# Patient Record
Sex: FEMALE | Race: WHITE | Employment: PART TIME | URBAN - NONMETROPOLITAN AREA
[De-identification: names, ages, dates, MRNs, and addresses within clinical notes are randomized per-mention and may not be internally consistent; named-entity substitution may affect disease eponyms.]

---

## 2018-10-18 ENCOUNTER — OFFICE VISIT (OUTPATIENT)
Dept: FAMILY MEDICINE CLINIC | Age: 23
End: 2018-10-18
Payer: COMMERCIAL

## 2018-10-18 VITALS
HEART RATE: 94 BPM | WEIGHT: 225 LBS | SYSTOLIC BLOOD PRESSURE: 110 MMHG | DIASTOLIC BLOOD PRESSURE: 86 MMHG | RESPIRATION RATE: 16 BRPM | TEMPERATURE: 97.2 F | OXYGEN SATURATION: 98 %

## 2018-10-18 DIAGNOSIS — J06.9 VIRAL URI WITH COUGH: Primary | ICD-10-CM

## 2018-10-18 DIAGNOSIS — H61.23 BILATERAL IMPACTED CERUMEN: ICD-10-CM

## 2018-10-18 PROCEDURE — 99202 OFFICE O/P NEW SF 15 MIN: CPT | Performed by: NURSE PRACTITIONER

## 2018-10-18 ASSESSMENT — ENCOUNTER SYMPTOMS
COUGH: 1
SINUS PAIN: 0
SORE THROAT: 0
RHINORRHEA: 1
TROUBLE SWALLOWING: 0
SINUS PRESSURE: 0
SHORTNESS OF BREATH: 0

## 2018-10-18 ASSESSMENT — PATIENT HEALTH QUESTIONNAIRE - PHQ9
SUM OF ALL RESPONSES TO PHQ QUESTIONS 1-9: 0
SUM OF ALL RESPONSES TO PHQ QUESTIONS 1-9: 0
SUM OF ALL RESPONSES TO PHQ9 QUESTIONS 1 & 2: 0
1. LITTLE INTEREST OR PLEASURE IN DOING THINGS: 0
2. FEELING DOWN, DEPRESSED OR HOPELESS: 0

## 2018-10-18 NOTE — PATIENT INSTRUCTIONS
Patient Education      Continue current medication. Follow up as needed. If worse return or go to ER. Upper Respiratory Infection (Cold): Care Instructions  Your Care Instructions  Patient Education        Earwax Blockage: Care Instructions  Your Care Instructions    Earwax is a natural substance that protects the ear canal. Normally, earwax drains from the ears and does not cause problems. Sometimes earwax builds up and hardens. Earwax blockage (also called cerumen impaction) can cause some loss of hearing and pain. When wax is tightly packed, you will need to have your doctor remove it. Follow-up care is a key part of your treatment and safety. Be sure to make and go to all appointments, and call your doctor if you are having problems. It's also a good idea to know your test results and keep a list of the medicines you take. How can you care for yourself at home? · Do not try to remove earwax with cotton swabs, fingers, or other objects. This can make the blockage worse and damage the eardrum. · If your doctor recommends that you try to remove earwax at home:  ¨ Soften and loosen the earwax with warm mineral oil. You also can try hydrogen peroxide mixed with an equal amount of room temperature water. Place 2 drops of the fluid, warmed to body temperature, in the ear two times a day for up to 5 days. ¨ Once the wax is loose and soft, all that is usually needed to remove it from the ear canal is a gentle, warm shower. Direct the water into the ear, then tip your head to let the earwax drain out. Dry your ear thoroughly with a hair dryer set on low. Hold the dryer several inches from your ear. ¨ If the warm mineral oil and shower do not work, use an over-the-counter wax softener. Read and follow all instructions on the label. After using the wax softener, use an ear syringe to gently flush the ear. Make sure the flushing solution is body temperature. Cool or hot fluids in the ear can cause dizziness.   When

## 2018-10-18 NOTE — PROGRESS NOTES
[facility] [place] -- [facility] [place]  [address] [place] [address] [address]w  [address] [address]  Dept: [phone]  Dept Fax: [phone]       Chief Complaint   Patient presents with    Congestion    Otalgia     feels full and muffled       Nurses Notes reviewed and I agree except as noted in the HPI. HISTORY OF PRESENT ILLNESS   Sarah Cook is a 26 y.o. female who presents With complaints of cough, congestion, and bilateral ear fullness. Onset of symptoms between 3 and 4 days ago, improving. Cough is intermittent, dry. Denies fever, wheezing, shortness of breath. Associated sinus congestion, denies sinus pain, pressure, or headache. States cough and congestion improving. Bilateral ear fullness, worsening. Right worse than left. Denies otalgia or otorrhea. No recent travel. No exposure to similar symptoms. Moderate improvement with over-the-counter medicine. REVIEW OF SYSTEMS     Review of Systems   Constitutional: Negative for chills, diaphoresis, fatigue and fever. HENT: Positive for congestion, hearing loss (bilateral ear fullness), postnasal drip and rhinorrhea. Negative for ear pain, sinus pain, sinus pressure, sore throat, tinnitus and trouble swallowing. Respiratory: Positive for cough. Negative for shortness of breath. Cardiovascular: Negative for chest pain. Musculoskeletal: Negative for neck pain and neck stiffness. Skin: Negative for rash. Neurological: Negative for dizziness and headaches. Hematological: Negative for adenopathy. PAST MEDICAL HISTORY   History reviewed. No pertinent past medical history. SURGICAL HISTORY     Patient  has a past surgical history that includes  section and knee surgery. CURRENT MEDICATIONS       No outpatient prescriptions prior to visit. No facility-administered medications prior to visit.         ALLERGIES     Patient is has No Known

## 2019-05-03 ENCOUNTER — OFFICE VISIT (OUTPATIENT)
Dept: FAMILY MEDICINE CLINIC | Age: 24
End: 2019-05-03
Payer: COMMERCIAL

## 2019-05-03 VITALS
OXYGEN SATURATION: 98 % | HEART RATE: 81 BPM | BODY MASS INDEX: 42.33 KG/M2 | HEIGHT: 62 IN | SYSTOLIC BLOOD PRESSURE: 110 MMHG | DIASTOLIC BLOOD PRESSURE: 70 MMHG | WEIGHT: 230 LBS | RESPIRATION RATE: 16 BRPM | TEMPERATURE: 98 F

## 2019-05-03 DIAGNOSIS — H61.23 BILATERAL IMPACTED CERUMEN: Primary | ICD-10-CM

## 2019-05-03 PROCEDURE — 1036F TOBACCO NON-USER: CPT | Performed by: NURSE PRACTITIONER

## 2019-05-03 PROCEDURE — 99213 OFFICE O/P EST LOW 20 MIN: CPT | Performed by: NURSE PRACTITIONER

## 2019-05-03 PROCEDURE — G8427 DOCREV CUR MEDS BY ELIG CLIN: HCPCS | Performed by: NURSE PRACTITIONER

## 2019-05-03 PROCEDURE — 69209 REMOVE IMPACTED EAR WAX UNI: CPT | Performed by: NURSE PRACTITIONER

## 2019-05-03 PROCEDURE — G8417 CALC BMI ABV UP PARAM F/U: HCPCS | Performed by: NURSE PRACTITIONER

## 2019-05-03 ASSESSMENT — ENCOUNTER SYMPTOMS
NAUSEA: 0
SINUS PRESSURE: 0
COUGH: 0
SINUS PAIN: 0
VOMITING: 0
SHORTNESS OF BREATH: 0

## 2019-05-03 ASSESSMENT — PATIENT HEALTH QUESTIONNAIRE - PHQ9
SUM OF ALL RESPONSES TO PHQ QUESTIONS 1-9: 1
2. FEELING DOWN, DEPRESSED OR HOPELESS: 1
SUM OF ALL RESPONSES TO PHQ QUESTIONS 1-9: 1
SUM OF ALL RESPONSES TO PHQ9 QUESTIONS 1 & 2: 1
1. LITTLE INTEREST OR PLEASURE IN DOING THINGS: 0

## 2019-05-03 NOTE — PATIENT INSTRUCTIONS
loss of hearing.    Watch closely for changes in your health, and be sure to contact your doctor if:    · You have pain or reduced hearing after 1 week of home treatment.     · You have any new symptoms, such as nausea or balance problems. Where can you learn more? Go to [email or web address] org and sign in to your [employer] account. Enter D557 in the [employer] box to learn more about \"Earwax Blockage: Care Instructions. \"     If you do not have an account, please click on the \"Sign Up Now\" link. Current as of: September 23, 2018  Content Version: 11.9  © 5859-6777 [employer]. Care instructions adapted under license by [facility] ([facility]). If you have questions about a medical condition or this instruction, always ask your healthcare professional. [address] any warranty or liability for your use of this information.

## 2019-05-03 NOTE — PROGRESS NOTES
[address] [address] [address] [address]  Dept: [phone]  Dept Fax: [phone]  Loc: [address] [place] [address] is a 26 y.o. female who presents todayfor Ear Fullness (bilateral x yesterday ) and Otalgia (right ear )      HPI:      Patient states that both of her ears are feeling plugged off and on she has had to have them flushed in the past.    She denies using q-tips, ear buds or ear plugs. The patient has No Known Allergies. Past MedicalHistory  Nahed Albright  has no past medical history on file. Medications  No current outpatient medications on file. Subjective:      Review of Systems   Constitutional: Negative for diaphoresis, fatigue and fever. HENT: Negative for ear discharge, ear pain, sinus pressure and sinus pain. Respiratory: Negative for cough and shortness of breath. Gastrointestinal: Negative for nausea and vomiting. Skin: Negative for rash. Neurological: Negative for seizures and headaches. Objective:        Vitals:    05/03/19 0827   BP: 110/70   Site: Left Upper Arm   Position: Sitting   Pulse: 81   Resp: 16   Temp: 98 °F (36.7 °C)   TempSrc: Oral   SpO2: 98%   Weight: 230 lb (104.3 kg)   Height: 5' 2\" (1.575 m)      Physical Exam   Constitutional: She is oriented to person, place, and time. She appears well-developed and well-nourished. No distress. HENT:   Head: Normocephalic and atraumatic. Right Ear: External ear normal.   Left Ear: External ear normal.   Nose: Nose normal.   Mouth/Throat: Oropharynx is clear and moist.   Bilateral ear canals with large amounts of soft yellow cerumen. After irrigation, bilateral TM intact gray and pearly. Eyes: Pupils are equal, round, and reactive to light. Conjunctivae and EOM are normal.   Neck: Normal range of motion. Neck supple. Cardiovascular: Normal rate. Pulmonary/Chest: Effort normal.   Abdominal: Soft.    Neurological: She is alert and oriented to person, place, and time. Skin: Skin is warm. Capillary refill takes less than 2 seconds. She is not diaphoretic. Vitals reviewed. Assessment/Plan:       Yasmin Eastman was seen today for ear fullness and otalgia. Diagnoses and all orders for this visit:    Bilateral impacted cerumen  -     UT REMOVAL IMPACTED CERUMEN IRRIGATION/LVG UNILAT      Patient encouraged to continue to refrain from using q-tips, ear buds and ear plugs. Return if symptoms worsen or fail to improve. Patient instructions given and reviewed.     Electronicallysigned by CASI Martinez CNP on 5/3/2019 at 4:11 PM

## 2020-02-20 ENCOUNTER — OFFICE VISIT (OUTPATIENT)
Dept: FAMILY MEDICINE CLINIC | Age: 25
End: 2020-02-20
Payer: COMMERCIAL

## 2020-02-20 VITALS
TEMPERATURE: 100.1 F | WEIGHT: 240 LBS | DIASTOLIC BLOOD PRESSURE: 62 MMHG | BODY MASS INDEX: 44.16 KG/M2 | HEART RATE: 116 BPM | OXYGEN SATURATION: 98 % | RESPIRATION RATE: 16 BRPM | HEIGHT: 62 IN | SYSTOLIC BLOOD PRESSURE: 102 MMHG

## 2020-02-20 LAB
INFLUENZA VIRUS A RNA: NEGATIVE
INFLUENZA VIRUS B RNA: POSITIVE

## 2020-02-20 PROCEDURE — 69210 REMOVE IMPACTED EAR WAX UNI: CPT | Performed by: FAMILY MEDICINE

## 2020-02-20 PROCEDURE — 87502 INFLUENZA DNA AMP PROBE: CPT | Performed by: FAMILY MEDICINE

## 2020-02-20 PROCEDURE — 99214 OFFICE O/P EST MOD 30 MIN: CPT | Performed by: FAMILY MEDICINE

## 2020-02-20 ASSESSMENT — PATIENT HEALTH QUESTIONNAIRE - PHQ9
SUM OF ALL RESPONSES TO PHQ QUESTIONS 1-9: 1
2. FEELING DOWN, DEPRESSED OR HOPELESS: 1
1. LITTLE INTEREST OR PLEASURE IN DOING THINGS: 0
SUM OF ALL RESPONSES TO PHQ QUESTIONS 1-9: 1
SUM OF ALL RESPONSES TO PHQ9 QUESTIONS 1 & 2: 1

## 2020-02-20 ASSESSMENT — ENCOUNTER SYMPTOMS
CONSTIPATION: 0
ABDOMINAL PAIN: 0
RHINORRHEA: 1
NAUSEA: 0
VOMITING: 0
SORE THROAT: 0
DIARRHEA: 0
SHORTNESS OF BREATH: 1
EYE REDNESS: 0
WHEEZING: 0
HEARTBURN: 0
HEMOPTYSIS: 0
COUGH: 1
EYE DISCHARGE: 0

## 2020-02-20 NOTE — PROGRESS NOTES
[address] [address]  Dept: [phone]  Dept Fax: [phone]  Loc: Filemon Brannon Herndon is a 27 y.o. female who presents todayfor Fever (x this morning- 100.3 then around 10:30 am temp was 101.3); Head Congestion (x 1 day); Generalized Body Aches (x today); Fatigue (x 1 day ); Cough (x 1 - 2 days ); and Shortness of Breath      HPI:    Cough   This is a new problem. The current episode started yesterday. The problem has been gradually worsening. The cough is productive of sputum. Associated symptoms include chills, a fever, myalgias, nasal congestion, postnasal drip, rhinorrhea and shortness of breath. Pertinent negatives include no chest pain, ear congestion, ear pain, eye redness, headaches, heartburn, hemoptysis, rash, sore throat, sweats, weight loss or wheezing. Treatments tried: ibuprofen. There is no history of asthma, bronchiectasis, bronchitis, COPD, emphysema, environmental allergies or pneumonia. Works at the [employer] in town. Did not get flu shot.  and son were sick recently too. Often gets wax in ears with trouble hearing. The patient has No Known Allergies. Past MedicalHistory  Dana Amador  has no past medical history on file. Past Surgical History  The patient  has a past surgical history that includes  section and knee surgery (Left). Family History  This patient's family history is not on file. Social History  Jo Ann  reports that she has never smoked. She has never used smokeless tobacco.    Medications   No current outpatient medications on file. Subjective:     Review of Systems   Constitutional: Positive for chills and fever. Negative for fatigue, unexpected weight change and weight loss. HENT: Positive for congestion, postnasal drip and rhinorrhea. Negative for ear discharge, ear pain, hearing loss and sore throat.     Eyes: Negative for discharge and redness. Respiratory: Positive for cough and shortness of breath. Negative for hemoptysis and wheezing. Cardiovascular: Negative for chest pain and palpitations. Gastrointestinal: Negative for abdominal pain, constipation, diarrhea, heartburn, nausea and vomiting. Genitourinary: Negative for difficulty urinating and dysuria. Musculoskeletal: Positive for myalgias. Skin: Negative for rash. Allergic/Immunologic: Negative for environmental allergies. Neurological: Negative for headaches. Objective:     Vitals:    02/20/20 1316   BP: 102/62   Site: Left Upper Arm   Position: Sitting   Pulse: 116   Resp: 16   Temp: 100.1 °F (37.8 °C)   TempSrc: Oral   SpO2: 98%   Weight: 240 lb (108.9 kg)   Height: 5' 2\" (1.575 m)       Physical Exam  Vitals signs reviewed. Constitutional:       General: She is not in acute distress. Appearance: She is well-developed. She is obese. She is ill-appearing. HENT:      Head: Normocephalic and atraumatic. Right Ear: Ear canal and external ear normal. There is impacted cerumen. Left Ear: Ear canal and external ear normal. There is impacted cerumen. Mouth/Throat:      Mouth: Mucous membranes are moist.      Pharynx: No oropharyngeal exudate or posterior oropharyngeal erythema. Eyes:      Conjunctiva/sclera: Conjunctivae normal.      Pupils: Pupils are equal, round, and reactive to light. Neck:      Musculoskeletal: Neck supple. Thyroid: No thyromegaly. Cardiovascular:      Rate and Rhythm: Normal rate and regular rhythm. Heart sounds: Normal heart sounds. Pulmonary:      Effort: Pulmonary effort is normal. No respiratory distress. Breath sounds: Normal breath sounds. Abdominal:      Palpations: Abdomen is soft. Tenderness: There is no abdominal tenderness. Lymphadenopathy:      Cervical: No cervical adenopathy. Skin:     General: Skin is warm and dry. Findings: No rash.    Neurological: Mental Status: She is alert. Comments: No obvious focal deficit. Psychiatric:         Behavior: Behavior normal.       Ceruminosis is noted. Wax is removed by syringing and manual debridement. Instructions for home care to prevent wax buildup are given. Normal exam post.      Assessment/Plan:   1. Influenza B  Positive for influenza B. Discussed risks and benefits of Tamiflu. Opted not to take which is reasonable since low risk adult. Discussed need for any high risk close contacts to seek medical attention for possible prophylaxis or treatment if symptomatic. Encouraged supportive care also with rest, hydration, honey for cough. Tylenol and ibuprofen as needed. Return promptly if worsening or in 1-2 weeks if symptom persist. Indications for prompt return if worsening reviewed in detail. 2. Fever, unspecified fever cause  As above. - POCT Influenza A/B DNA (Alere i)    3. Bilateral impacted cerumen  Cleaned today. - 20463 - MT REMOVE IMPACTED EAR WAX        Return if symptoms worsen or fail to improve, for Annual wellness visit. Orders Placed  Orders Placed This Encounter   Procedures    POCT Influenza A/B DNA (Alere i)    50710 - MT REMOVE IMPACTED EAR WAX       Prescriptions given/sent   No orders of the defined types were placed in this encounter. Patientinstructions given and reviewed. Discussed use, benefit, and side effects of prescribedmedications. All patient questions answered. Pt voiced understanding.                Electronically signed by Verlon Frankel, MD on 2/20/2020at 1:48 PM